# Patient Record
(demographics unavailable — no encounter records)

---

## 2024-10-25 NOTE — PHYSICAL EXAM
[Normal] : affect was normal and insight and judgment were intact [de-identified] : epigastric pain?

## 2024-10-25 NOTE — HISTORY OF PRESENT ILLNESS
[de-identified] : Patient is a 57 year old M with a PMHx of DM1, Hashimoto thyroiditis, HLD, QUITA coming in to establish care.  Patient reports hernia above his umbilicus for about 10 years.  Patient reports that it is not painful but would like it repaired as is currently not working at the moment.  Patient reports that he has been more congested recently and that is why his BPs have been elevated.

## 2024-10-29 NOTE — PHYSICAL EXAM
[Normal Breath Sounds] : Normal breath sounds [Normal Heart Sounds] : normal heart sounds [de-identified] : Appears well. Moderate cental obesity. Large protruding hernia in the epigastrium. More pronounced when standing. Only partially reducible.- likely contains omentum. Cannot determine the nsize of the neck of the hernia

## 2024-10-29 NOTE — HISTORY OF PRESENT ILLNESS
[de-identified] : Patient presents for evaluation for repair of a large primary epigastric hernia. he says that he has had the hernia for many years. it does not hurt but it is enlarging. He denies obstructive symptoms   The patient has a history of DM and has a persistently elevated Hg A1C. He says this has been harder to control recently because her recently lost his job as an  and he is more sedentary than normal. He has been under the treatement of Endcrinology and has had some imprvement of the HgA1C - 9.3.  He also has  a history of HTN, Hashimoto's, hypercholesterolemia. He has good exercise tolerance and denies SOBor CP

## 2024-10-29 NOTE — PLAN
[FreeTextEntry1] : I have explained the pathophysiology of hernia formation to the patient. I explained the potential risks of bowel incarceration and need for emergency surgery if the hernia is not repaired. The patient understands and would like to proceed with surgery.  I have also explained that, typically, we do not perform elective surgery if the Hg A1C is elevated above 8. The patient agrees to comply with diet alterations, continuation of current medications and will increase exercise.  Surgery is tentatively scheduld for Dec 9.  Will send for CT abd - pelvis prior to surgery to better determine the anatomy of the hernia and to plan surgical approach Patient will return to me for interval exam and to discuss CT and to recheck Hg A1c  I will discuss the plan with his PMD and with his Endocrinologist

## 2024-11-19 NOTE — HISTORY OF PRESENT ILLNESS
[de-identified] : Patient recently presented for evaluation of a large and enlarging primary ventral hernia. His PMH is notable for DM and a Hg A1C of 9.3. [de-identified] : He was scheduled for follow up evaluation after CT to better evaluate the anatomy of the hernia. Unfortunately, he was unable to secure authorization for the CT and it was not performed  He is now using a constant blood glucose monitor and he feels he is in better control of his blood sugar. He was decreased alcohol intake but has not been able to initiate an exercise program.

## 2024-11-19 NOTE — PLAN
[FreeTextEntry1] : Will continue to try to obtain authorization for CT abd / pelvis to plan best surgical approach FU after CT Patient is scheduled to see his Endocrinologist - check Hg A1C at that time

## 2024-11-19 NOTE — PHYSICAL EXAM
[de-identified] : No distress [de-identified] : Moderate central obesity. Large incarcerated moderate ventral hernia. Unable to determine size of the neck

## 2024-11-26 NOTE — PHYSICAL EXAM
[Normal] : affect was normal and insight and judgment were intact [de-identified] : ventral hernia, reducible

## 2024-11-26 NOTE — HISTORY OF PRESENT ILLNESS
[de-identified] : Patient is a 57 year old M with a PMHx of DM1, Hashimoto thyroiditis, HLD, QUITA unable to tolerate CPAP coming in for follow up.  Patient reports over the past two weeks has been having improved glycemic control after using dexcom.  Patient reports glucose around 120s in am.  Patient reports he hasn't started using BP cuff at home yet but, will try do so as he just got machine.  Patient pending ventral hernia repair after CT scan and clearance from endocrine.

## 2024-12-13 NOTE — ASSESSMENT
[Diabetes Foot Care] : diabetes foot care [Long Term Vascular Complications] : long term vascular complications of diabetes [Carbohydrate Consistent Diet] : carbohydrate consistent diet [Importance of Diet and Exercise] : importance of diet and exercise to improve glycemic control, achieve weight loss and improve cardiovascular health [Exercise/Effect on Glucose] : exercise/effect on glucose [Hypoglycemia Management] : hypoglycemia management [Glucagon Use] : glucagon use [Ketone Testing] : ketone testing [Action and use of Insulin] : action and use of short and long-acting insulin [Self Monitoring of Blood Glucose] : self monitoring of blood glucose [Insulin Self-Administration] : insulin self-administration [Injection Technique, Storage, Sharps Disposal] : injection technique, storage, and sharps disposal [Sick-Day Management] : sick-day management [Retinopathy Screening] : Patient was referred to ophthalmology for retinopathy screening [Weight Loss] : weight loss [FreeTextEntry1] : Type 1 Diabetes uncontrolled: A1c: 8.9% - slightly improved  Glucose: 59--->108 (was given some apple juice) Current regimen: Tresiba 75 Llumjev varies (very insulin resistant) Carb ratio appears to be 1:2 + needs to reconnect to libreview.   Destin 3 on  reviewed New regimen: Discussed less insulin with breakfast compared to dinner, still having postprandial high with dinner even with 70 units (needs more insulin for dinner) - May also attempt to reduce carbohydrate intake at dinner  Discussed Tempo - states was on it previously, does not want to trial again  Refusing Insulin pump.  was looking to get Eversense implantable CGM which lasts 6 months. - but states it is too expensive and difficult to get done  Eye exam: due for appt Foot exam: due for appt LDL: 86 T BP: 145/96 BMI: 33 JOHAN + Insulin antibody +   Hashimoto's Thyroiditis: on 125mcg Unithroid  Euthyroid , TFTs from Oct reviewed

## 2024-12-13 NOTE — HISTORY OF PRESENT ILLNESS
[FreeTextEntry1] : Interval History: 57-year-old male presenting for type 1 DM f/u Patient has placed Destin 3 back on, using . Has had occasional lows earlier in the day. He has been attempting to eat better and use insulin accordingly.  Seen by surgeon for work up and potential surgery for abdominal mass, initially thought to be a hernia. Has been trying to get sugars controlled. Pt went for MRI to work up abdominal mass but was upset at the center and did not complete the study.   Current DM medication regimen: Tresiba 74units, Llumjev 30-70units depending on what he is eating (very insulin resistant)  Last A1c:9.3% Glucose Monitoring: starting using destin 3, uses .  FSG AM  FSG PM FSG Bedtime Hypoglycemia? POCT A1c: 8.9% glucose: 59  DKA/HHS? never  Changes in Diet? no changes  cereal and white toast 60 units or 3 waffles or 2 waffles and an egg coffee  skips lunch  has dinner- highest amount of carb  burger + fries - 70 units still remain elevated   Changes in Activity/Sleep? 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 Siegel  Specialists: Last Dilated Eye exam: still due  Podiatry:  due  Nutrition/Education: has not seen in a while

## 2024-12-24 NOTE — PLAN
[FreeTextEntry1] : Plan incisional biopsy Donis 6 Planning open MRI to better characterize the nature of the mass and to plan definitive surgery Patient may need plastics for ultimate excision and closure

## 2024-12-24 NOTE — HISTORY OF PRESENT ILLNESS
[de-identified] : Patient returns after CT of abdomen reveals no hernia but does show a large soft tissue mass of unclear etiology. [de-identified] : Patient was not able to tolerate the MRI (anxiety). He returns for follow up. There have been no interval changes to the mass. Patient is diabetic and take insulin injections but injects in various places in his abdomen, not just the region of the mass (so injection related inflammation is less likely)

## 2024-12-24 NOTE — ASSESSMENT
[FreeTextEntry1] : Soft tissue mass of unclear etiology. Ddx includes: Inflammatory mass, sarcoma or liposarcoma, benign lipoma

## 2024-12-24 NOTE — PHYSICAL EXAM
[Normal Breath Sounds] : Normal breath sounds [Normal Heart Sounds] : normal heart sounds [Abdominal Masses] : Abdominal mass present [de-identified] : appears well [de-identified] : LArge abdominal wall mass (approx 20 cm x 15 cm) - firrm,, not mobile, no erythema

## 2025-01-21 NOTE — PHYSICAL EXAM
[Normal Breath Sounds] : Normal breath sounds [Normal Heart Sounds] : normal heart sounds [Normal Rate and Rhythm] : normal rate and rhythm [de-identified] : appears well. Comfortable [de-identified] : Large abdominal wall mass (20 by 15 cm). Biopsy site healing well. No infection

## 2025-01-21 NOTE — ASSESSMENT
[FreeTextEntry1] : 56 yo M with multiple controlled medical problems (DM, HLD, HTN) and large )20cm x 15cm) abdominal wall mass, now biopsy negative for malignancy. Patient would like to undergo completion excision in the OR.

## 2025-01-21 NOTE — HISTORY OF PRESENT ILLNESS
[de-identified] : Patient returns for post-op visit. Patient underwent incisional biopsy of a large abdominal wall mass that had suspicious features. [de-identified] : No complaints. No pain. No wound healing problems. Pathology results demonstrate benign fibro-fatty liupoma

## 2025-01-21 NOTE — PLAN
[FreeTextEntry1] : Plan excision of abdominal wall mass on Feb 3 under MAC versus GETA. Risks and benefits (including bleeding, infection, wound disruption, recurrence) explained to the patient. He understands and would like to proceed. No new labs needed

## 2025-02-25 NOTE — HISTORY OF PRESENT ILLNESS
[de-identified] : The patient presents for follow up after excision of a large symptomatic abdominal wall lipoma excision. He feels well. He has no pain and states that his back pain has improved since surgery. JAY has been in place and has drained about 10 - 15 cc per day  Path reviewed - fibrous lipoma

## 2025-02-25 NOTE — PHYSICAL EXAM
[de-identified] : appears well [de-identified] : Abdominal wall wound healing well. No signs of infection. Steri strips in place. Serous drainage from JAY

## 2025-02-25 NOTE — ASSESSMENT
[FreeTextEntry1] : Doing well after excision of abdominal wall fibro-lipoma.  JAY pulled.   Patient may return as needed.

## 2025-02-26 NOTE — HISTORY OF PRESENT ILLNESS
[de-identified] : Patient is a 57 year old M with a PMHx of DM1, Hashimoto thyroiditis, HLD, QUITA unable to tolerate CPAP coming in for follow up.  Patient reports he recently and hernia repair surgery with improvement of pain.  Patient reports he feels well and is taking medications as prescribed.

## 2025-03-11 NOTE — PHYSICAL EXAM
[Alert] : alert [Well Nourished] : well nourished [Obese] : obese [No Acute Distress] : no acute distress [Well Developed] : well developed [Normal Sclera/Conjunctiva] : normal sclera/conjunctiva [EOMI] : extra ocular movement intact [No Proptosis] : no proptosis [Normal Oropharynx] : the oropharynx was normal [Thyroid Not Enlarged] : the thyroid was not enlarged [No Thyroid Nodules] : no palpable thyroid nodules [No Respiratory Distress] : no respiratory distress [No Accessory Muscle Use] : no accessory muscle use [Clear to Auscultation] : lungs were clear to auscultation bilaterally [Normal S1, S2] : normal S1 and S2 [Normal Rate] : heart rate was normal [Regular Rhythm] : with a regular rhythm [No Edema] : no peripheral edema [Pedal Pulses Normal] : the pedal pulses are present [Normal Bowel Sounds] : normal bowel sounds [Soft] : abdomen soft [Normal Anterior Cervical Nodes] : no anterior cervical lymphadenopathy [No Spinal Tenderness] : no spinal tenderness [Spine Straight] : spine straight [No Stigmata of Cushings Syndrome] : no stigmata of Cushings Syndrome [Normal Gait] : normal gait [Normal Strength/Tone] : muscle strength and tone were normal [No Rash] : no rash [Acanthosis Nigricans] : no acanthosis nigricans [Normal Reflexes] : deep tendon reflexes were 2+ and symmetric [No Tremors] : no tremors [Oriented x3] : oriented to person, place, and time

## 2025-03-11 NOTE — HISTORY OF PRESENT ILLNESS
[FreeTextEntry1] : Interval History: 57-year-old male presenting for type 1 DM f/u s/p excision of a large symptomatic abdominal wall lipoma excision Patient reports feeling much better, no longer having back pain. Has been using less insulin and had a reduction in levothyroxine dosing.  Current DM medication regimen: Tresiba 74units, Llumjev 30-70units depending on what he is eating (very insulin resistant)  Last A1c:8.9% Glucose Monitoring: starting using RecordSled 3, uses .  Name: Quang Willson YOB: 1967 Report Period: 02/26/2025 - 03/11/2025 (14 days) Generated: 03/11/2025 Time CGM Active: 89% Having some overnight lows   Glucose Statistics and Targets Average Glucose: 160 mg/dL Glucose Management Indicator (GMI): 7.1% Glucose Variability (%CV): 37.9% Target Range: 70 - 180 mg/dL   Time in Ranges Very High: >250 mg/dL --- 8% High: 181 - 250 mg/dL --- 29% Target Range: 70 - 180 mg/dL --- 60% Low: 54 - 69 mg/dL --- 3% Very Low: <54 mg/dL --- 0%  POCT A1c: 7.7% glucose: 164  DKA/HHS? never  Changes in Diet? no changes  Changes in Activity/Sleep? 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 Siegel  Specialists: Last Dilated Eye exam: still due  Podiatry:  due  Nutrition/Education: has not seen in a while

## 2025-05-27 NOTE — HISTORY OF PRESENT ILLNESS
[de-identified] : Patient is a 57 year old M with a PMHx of DM1, Hashimoto thyroiditis, HLD, QUITA unable to tolerate CPAP coming in for follow up.  Patient reports some tingling sensation and discomfort of the left thigh with no particular triggers.  Patient reports no change with symptoms upon activity.  Patient reports pain started shortly after hernia repair surgery.

## 2025-06-12 NOTE — ASSESSMENT
[Diabetes Foot Care] : diabetes foot care [Long Term Vascular Complications] : long term vascular complications of diabetes [Carbohydrate Consistent Diet] : carbohydrate consistent diet [Importance of Diet and Exercise] : importance of diet and exercise to improve glycemic control, achieve weight loss and improve cardiovascular health [Exercise/Effect on Glucose] : exercise/effect on glucose [Hypoglycemia Management] : hypoglycemia management [Glucagon Use] : glucagon use [Ketone Testing] : ketone testing [Action and use of Insulin] : action and use of short and long-acting insulin [Self Monitoring of Blood Glucose] : self monitoring of blood glucose [Insulin Self-Administration] : insulin self-administration [Injection Technique, Storage, Sharps Disposal] : injection technique, storage, and sharps disposal [Sick-Day Management] : sick-day management [Retinopathy Screening] : Patient was referred to ophthalmology for retinopathy screening [Weight Loss] : weight loss [Levothyroxine] : The patient was instructed to take Levothyroxine on an empty stomach, separate from vitamins, and wait at least 30 minutes before eating [FreeTextEntry1] : Type 1 Diabetes uncontrolled: A1c: 8.0%  GMI: 7.5% Glucose: 72 Current regimen: Tresiba 64 Llumjev varies (very insulin resistant) 20-35units Lyumjev (Carb ratio now appears to be closer to 1:4) Destin 3 reviewed New regimen:  Tresiba 64units ,Llumjev varies (very insulin resistant) 30-75units Lyumjev (Carb ratio now appears to be closer to 1:4)  Refusing Insulin pump.   Eye exam: due for appt Foot exam: due for appt LDL: 78 T BP: 153/91 BMI: 33 JOHAN + Insulin antibody +   Hashimoto's Thyroiditis: TSH was elevated on 112mcg- was increased back to 125mcg May have gained some weight in setting of hypothyroidism  Will repeat TSH and Free T4 in 2 months

## 2025-06-12 NOTE — HISTORY OF PRESENT ILLNESS
[FreeTextEntry1] : Interval History: 57-year-old male presenting for type 1 DM f/u s/p excision of a large symptomatic abdominal wall lipoma excision Patient reports feeling much better, no longer having back pain. Has been using less insulin. Levothyroxine dose increased back 2 weeks ago after noting elevated TSH on lower dose.  Current DM medication regimen: Tresiba 64units, Llumjev 20-30units depending on what he is eating   POCT A1c: 8.0% Name: Quang Willson YOB: 1967 Report Period: 05/30/2025 - 06/12/2025 (14 days) Generated: 06/12/2025 Time CGM Active: 39%   Glucose Statistics and Targets Average Glucose: 174 mg/dL Glucose Management Indicator (GMI): 7.5% Glucose Variability (%CV): 34.2% Target Range: 70 - 180 mg/dL   Time in Ranges Very High: >250 mg/dL --- 12% High: 181 - 250 mg/dL --- 28% Target Range: 70 - 180 mg/dL --- 56% Low: 54 - 69 mg/dL --- 4% Very Low: <54 mg/dL --- 0%   POCT A1c: 7.7% glucose: 164  DKA/HHS? never  Changes in Diet? no changes but taking food to work   Changes in Activity/Sleep? 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 Siegel  Specialists: Last Dilated Eye exam: still due  Podiatry:  due  Nutrition/Education: has not seen in a while